# Patient Record
Sex: FEMALE | Race: WHITE | NOT HISPANIC OR LATINO | Employment: FULL TIME | URBAN - METROPOLITAN AREA
[De-identification: names, ages, dates, MRNs, and addresses within clinical notes are randomized per-mention and may not be internally consistent; named-entity substitution may affect disease eponyms.]

---

## 2017-08-09 ENCOUNTER — GENERIC CONVERSION - ENCOUNTER (OUTPATIENT)
Dept: OTHER | Facility: OTHER | Age: 52
End: 2017-08-09

## 2018-01-02 ENCOUNTER — ALLSCRIPTS OFFICE VISIT (OUTPATIENT)
Dept: OTHER | Facility: OTHER | Age: 53
End: 2018-01-02

## 2018-01-02 DIAGNOSIS — R29.898 OTHER SYMPTOMS AND SIGNS INVOLVING THE MUSCULOSKELETAL SYSTEM: ICD-10-CM

## 2018-01-03 NOTE — CONSULTS
Assessment   1  Chronic midline low back pain with bilateral sciatica (724 2,724 3,338 29)     (M54 41,M54 42,G89 29)   2  Pain in both thighs (729 5) (M79 651,M79 652)   3  Weakness of both lower extremities (729 89) (R29 898)    Plan   Chronic midline low back pain with bilateral sciatica    · *1 - [facility] Co-Management  *  Status: Active  Requested for:    [id number]   Ordered; For: Chronic midline low back pain with bilateral sciatica; Ordered By: Noel Ingram Performed:  Due: 55YYI5817  Care Summary provided  : Yes  Weakness of both lower extremities    · (1) ACETYLCHOLINE RECEPTOR ANTIBODY; Status:Active; Requested PHS:[id number]; Perform:[facility]; SUU:[id number];JYNUWAJ; For:Weakness of both lower extremities; Ordered By:Qi Lane;   · (1) ACETYLCHOLINE RECEPTOR, BLOCKING; Status:Active; Requested HIM:[id number]; Perform:[facility]; JUF:[id number];CPLJUCZ; For:Weakness of both lower extremities; Ordered By:Qi Lane;   · (1) ACETYLCHOLINE RECEPTOR, MODULATING; Status:Active; Requested    HHC:[id number]; Perform:[facility]; VWN:[id number]; For:Weakness of both lower extremities; Ordered By:Qi Lane;   · (1) TERESA SCREEN W/REFLEX TO TITER/PATTERN; Status:Active; Requested    CRU:[id number]; Perform:[facility]; ARQ:[id number];LFNJMKX; For:Weakness of both lower extremities; Ordered By:Qi Lane;   · (1) CK (CPK); Status:Active; Requested CSB:[id number]; Perform:[facility]; NQT:[id number];YFSZTJI; For:Weakness of both lower extremities; Ordered By:Qi Lane;   · (1) C-REACTIVE PROTEIN; Status:Active; Requested QIY:[id number]; Perform:[facility]; MMD:[id number];ISHXHTY; For:Weakness of both lower extremities; Ordered By:Qi Lane;   · (1) RHEUMATOID FACTOR SCREEN; Status:Active; Requested SLS:[id number]; Perform:[facility]; MZF:[id number];HQGTBQA; For:Weakness of both lower extremities; Ordered By:Qi Lane;   · (1) SED RATE; Status:Active; Requested IWL:[id number]; Perform:[facility]; WJY:[id number];BXXLEDO; For:Weakness of both lower extremities; Ordered By:Qi Lane;   · (1) TSH; Status:Active; Requested ZUR:[id number]; Perform:[facility]; KTU:[id number];MSZVXTD; For:Weakness of both lower extremities; Ordered By:Qi Lane;   · Follow-up visit in 3 months Evaluation and Treatment  Follow-up  Status: Complete     Done: 12GFU1741   Ordered; For: Weakness of both lower extremities; Ordered By: Marcin Tesfaye Performed:  Due: 51WSH6725; Last Updated By: Twin Matos; 1/2/2018 10:28:55 AM    Discussion/Summary   Discussion Summary:    Bilateral thigh pain: Etiology unclear  ? Related to lower back issues or musculoskeletal  extensive workup in the past by other neurologist has been inconclusive per the patient  Patient recommended to send lumbar spine MRI results and lab work results performed at [facility] and other physicians in the past  Will obtain CPK level, Acetyl choline receptor antibodies and other blood work as well  EMG showed no evidence of myopathy or neuropathy of right lower extremity and bilateral upper extremities  Patient is on low-dose Cymbalta     Chronic lower back pain: Will refer patient to pain management  Patient recommended to send us records from the neurosurgeon as well as MRI of the lumbar spine  Patient states that physical therapy made her symptoms worse in the past     Counseling Documentation With Imm: The patient was counseled regarding instructions for management,-- patient and family education  total time of encounter was 50 minutes-- and-- 25 minutes was spent counseling  Chief Complaint   Chief Complaint Free Text Note Form: Patient present for neurology consult for leg weakness        History of Present Illness   HPI: Patient is a 81-year-old lady coming in for consultation in regards to leg weakness  states that about a year ago she and her  started doing exercises  She felt a muscle pull in the left lower extremity mostly in thigh and since then soon in a week she had similar symptoms in the other leg as well  She has some paresthesia in her thighs  She has chronic low back pain  She uses a cane more for steps and balance  She states she fell three times in February 2017, but since then she has not fallen down  She felt she was limp and MRI lower spine was done and felt to have discs  She was given prednisone which helped and her symptoms came back  She was later tried on Cymbalta 30 mg daily which helped but did not take away pain completely and later had blurred vision, so was stopped  She was tried on Gabapentin and did not work  She was again tried on Cymbalta 20 mg every other day and now has started every day  She also does deep muscle massage as well  However it feels better with massage however later it did not work  She tried PT but it made it worse  She was seen by Spine surgeon, Dr Kole Quispe and it was told no surgery is recommended for her back  She was not evaluated by pain management  Patient told to send results of MRI L-spine ( not available) and blood work  of the cervical spine done at [facility] on 07/20/2017 showed no significant central canal or foraminal stenosis throughout the cervical region, diffuse minimal degenerative disc disease, mild to moderate facet arthropathy from C3-C4 through C7-T1 with moderate left facet arthropathy at C2-C3, very small broad-based central to right paracentral disc protrusion at C5-C6 causing minimal central canal stenosis  MRI of the thoracic spine on 7/ 20/17 showed no central canal or foraminal stenosis throughout the thoracic spine, thoracic cord is normal in its appearance, diffuse moderate facet arthropathy most prominent at the T10-11 level, very mild degenerative disc disease from T2-3 3 through T8-T9   MRI of the brain on 07/11/2017 showed no enhancing intracranial mass or any acute infarct  No white matter signal abnormality or focal lesion  performed on 6/6/17 By Dr Marcela Balbuena at [facility] showed no evidence of any median neuropathy at both the wrists, no evidence of bilateral ulnar neuropathy at the elbow or cervical radiculopathy, plexopathy or other pathology  EMG performed by Dr Marylene Silvius on 05/16/2017 of her right lower extremity showed it was a normal study, no evidence of generalized large fiber neuropathy, no evidence of generalized myopathy no clear electrophysiological evidence of lumbosacral radiculopathy on the right  However upper motor neuron lesion cannot be ruled out based on EMG     Reviewed records from neurologist at [facility], Dr Yvrose Larson who initially started off the evaluation in May 2017 with peripheral nervous system workup with EMG is of bilateral upper and lower extremities and blood work however as these testing was normal later central nervous system etiology was explored with MRI of the cervical spine thoracic spine MRI brain and additional blood work That was ordered in June 2017  Review of Systems   Neurological ROS:      Constitutional: appetite changes  HEENT: blurred vision-- and-- dysphagia  Cardiovascular:  no chest pain or pressure, no palpitations present, the heart rate was not rapid or irregular, no swelling in the arms or legs, no poor circulation  Respiratory:  no unusual or persistant cough, no shortness of breath with or without exertion  Gastrointestinal:  no nausea, no vomiting, no diarrhea, no abdominal pain, no changes in bowel habits, no melena, no loss of bowel control  Genitourinary:  no incontinence, no feelings of urinary urgency, no increase in frequency, no urinary hesitancy, no dysuria, no hematuria  Musculoskeletal: arthralgias,-- myalgias,-- head/neck/back pain-- and-- pain while walking  Integumentary   no masses, no rash, no skin lesions, no livedo reticularis  Psychiatric:  no anxiety, no depression, no mood swings, no psychiatric hospitalizations, no sleep problems  Endocrine  no unusual weight loss or gain, no excessive urination, no excessive thirst, no hair loss or gain, no hot or cold intolerance, no menstrual period change or irregularity, no loss of sexual ability or drive, no erection difficulty, no nipple discharge  Hematologic/Lymphatic:  no unusual bleeding, no tendency for easy bruising, no clotting skin or lumps  Neurological General: waking up at night  Neurological Mental Status:  no confusion, no mood swings, no alteration or loss of consciousness, no difficulty expressing/understanding speech, no memory problems  Neurological Cranial Nerves: taste or smell loss/changes  Neurological Motor findings include:  no tremor, no twitching, no cramping(pre/post exercise), no atrophy  Neurological Coordination:  no unsteadiness, no vertigo or dizziness, no clumsiness, no problems reaching for objects  Neurological Sensory: tingling  Neurological Gait: difficulty walking  ROS Reviewed:    ROS reviewed  Surgical History   1  History of Femoral Hernia Repair   2  History of Myringotomy - With Ventilating Tube Insertion   3  History of Tonsillectomy   4  History of Treatment Of Pelvic Fracture  Surgical History Reviewed: The surgical history was reviewed and updated today  Family History   Father    1  Family history of lung cancer (V16 1) (Z80 1)  Maternal Grandmother    2  Family history of Parkinsonism (V17 2) (Z82 0)  Family History Reviewed: The family history was reviewed and updated today  Social History    · Drinks coffee   · Former smoker (S32 76) (K02 499)   · Lives with    · Lives with son   ·    · No illicit drug use   · One child   · Social drinker (V49 89) (Z78 9)  Social History Reviewed:  The social history was reviewed and updated today  Current Meds    1  Advil TABS; TAKE 1 TABLET 3 TIMES DAILY AS NEEDED; Therapy: (Recorded:02Jan2018) to Recorded   2  Cymbalta 30 MG Oral Capsule Delayed Release Particles (DULoxetine HCl); take 1     capsule daily; Therapy: (Recorded:02Jan2018) to Recorded   3  Tylenol CAPS; TAKE 1 CAPSULE Every 6 hours PRN; Therapy: ([id number]) to Recorded  Medication List Reviewed: The medication list was reviewed and updated today  Allergies   1  No Known Drug Allergies    Vitals   Signs   Recorded: 02Jan2018 09:36AM   Heart Rate: 80  Respiration: 16  Systolic: 779, LUE, Sitting  Diastolic: 77, LUE, Sitting  Height: 5 ft 6 in  Weight: 197 lb   BMI Calculated: 31 8  BSA Calculated: 1 99    Physical Exam       General examination:     Patient is awake and alert  Eyes: Conjunctiva and sclera are clear  HEENT: External examination is normal  Neck: Supple  Lungs: Clear to auscultation bilaterally  CVS: S1, S2 heard  Abdomen: Soft, nontender  Extremities: No clubbing, edema, cyanosis  Skin: No rashes  Neurological examination:      Mental status: Patient is awake, alert, oriented to time place and person  Attention, concentration, fund of knowledge is intact  Language: No evidence of aphasia or dysarthria  Memory: Repetition 3/3 and recall 3/3  Cranial nerves: Pupils equal, reacting to light and accommodation  Extraocular movements intact  Visual fields are full  Fundi are difficult to visualize bilaterally  Facial sensation is intact  No facial weakness is noted  Finger rub test is intact bilaterally  Tongue and uvula are in midline  Gag is intact  Shoulder shrug is 5/5 bilaterally  Motor examination: Tone is normal  No atrophy noted  Strength is 5/5 throughout and bilateral HF had poor effort and 5-/5 on both sides  Sensory examination: Light touch is intact  Pinprick, temperature are intact   Vibration is intact Proprioception is intact  Deep tendon reflexes: 1 throughout  Plantars are downgoing bilaterally  Coordination: Finger-nose test and finger tapping intact bilaterally  Heel-to-shin test is difficult to perform bilaterally  Gait: Patient walks with a slight limp with help of her cane         Results/Data   Diagnostic Studies Reviewed:      Diagnostic Review MRI of the cervical spine done at [facility] on 07/20/2017 showed no significant central canal or foraminal stenosis throughout the cervical region, diffuse minimal degenerative disc disease, mild to moderate facet arthropathy from C3-C4 through C7-T1 with moderate left facet arthropathy at C2-C3, very small broad-based central to right paracentral disc protrusion at C5-C6 causing minimal central canal stenosis  MRI of the thoracic spine on 7/ 20/17 showed no central canal or foraminal stenosis throughout the thoracic spine, thoracic cord is normal in its appearance, diffuse moderate facet arthropathy most prominent at the T10-11 level, very mild degenerative disc disease from T2-3 3 through T8-T9  MRI of the brain on 07/11/2017 showed no enhancing intracranial mass or any acute infarct  No white matter signal abnormality or focal lesion  performed on 6/6/17 By Dr Graciela Craig at [facility] showed no evidence of any median neuropathy at both the wrists, no evidence of bilateral ulnar neuropathy at the elbow or cervical radiculopathy, plexopathy or other pathology  EMG performed by Dr John Doyle on 05/16/2017 of her right lower extremity showed it was a normal study, no evidence of generalized large fiber neuropathy, no evidence of generalized myopathy no clear electrophysiological evidence of lumbosacral radiculopathy on the right  However upper motor neuron lesion cannot be ruled out based on EMG        Signatures    Electronically signed by : Lisa Weber MD; Jan 2 2018 11:35AM EST                       (Author)

## 2018-01-22 VITALS
DIASTOLIC BLOOD PRESSURE: 77 MMHG | WEIGHT: 197 LBS | HEART RATE: 80 BPM | HEIGHT: 66 IN | SYSTOLIC BLOOD PRESSURE: 163 MMHG | BODY MASS INDEX: 31.66 KG/M2 | RESPIRATION RATE: 16 BRPM

## 2018-01-23 ENCOUNTER — GENERIC CONVERSION - ENCOUNTER (OUTPATIENT)
Dept: OTHER | Facility: OTHER | Age: 53
End: 2018-01-23

## 2018-01-24 NOTE — MISCELLANEOUS
Message   Recorded as Task   Date: 01/02/2018 10:18 AM, Created By: System   Task Name: Schedule Appointment   Assigned To: [facility],Team   Regarding Patient: Andre Espinosa, Status: In Progress   Comment:    System - 02 Jan 2018 10:18 AM     Preferred Communication: Mail  Ordering Site: [facility]    Referred To: [id number]  To Be Done: 02 Jan 2018   Joni Rosen - 02 Jan 2018 10:27 AM     TASK REASSIGNED: Previously Assigned To Qi Lane  PLEASE CONTACT PATIENT FOR APPT  Mireille De Jesus - 02 Jan 2018 11:09 AM     TASK IN PROGRESS   Mireille De Jesus - 02 Jan 2018 11:11 AM     TASK EDITED  called patient, however she was driving and requested I call her in a half hour  no intake started   Mireille De Jesus - 02 Jan 2018 12:08 PM     TASK EDITED  intake started in [place] faxed to [place]  awaiting last 3 pcp office notes to verify pt has established care with a pcp  Active Problems    1  Chronic midline low back pain with bilateral sciatica (724 2,724 3,338 29)   (M54 41,M54 42,G89 29)   2  Pain in both thighs (729 5) (M79 651,M79 652)   3  Weakness of both lower extremities (729 89) (R29 898)    Current Meds   1  Advil TABS; TAKE 1 TABLET 3 TIMES DAILY AS NEEDED; Therapy: (Recorded:02Jan2018) to Recorded   2  Cymbalta 30 MG Oral Capsule Delayed Release Particles (DULoxetine HCl); take 1   capsule daily; Therapy: (Recorded:02Jan2018) to Recorded   3  Tylenol CAPS; TAKE 1 CAPSULE Every 6 hours PRN; Therapy: (Recorded:02Jan2018) to Recorded    Allergies    1   No Known Drug Allergies    Signatures   Electronically signed by : Kieran Knight, ; Jan 23 2018  3:14PM EST                       (Author)

## 2018-08-13 ENCOUNTER — TRANSCRIBE ORDERS (OUTPATIENT)
Dept: ADMINISTRATIVE | Facility: HOSPITAL | Age: 53
End: 2018-08-13

## 2018-08-13 ENCOUNTER — APPOINTMENT (OUTPATIENT)
Dept: LAB | Facility: IMAGING CENTER | Age: 53
End: 2018-08-13
Payer: COMMERCIAL

## 2018-08-13 DIAGNOSIS — M54.5 LOW BACK PAIN, UNSPECIFIED BACK PAIN LATERALITY, UNSPECIFIED CHRONICITY, WITH SCIATICA PRESENCE UNSPECIFIED: Primary | ICD-10-CM

## 2018-08-13 DIAGNOSIS — M54.16 LUMBAR RADICULOPATHY: ICD-10-CM

## 2018-08-13 DIAGNOSIS — M51.26 LUMBAR DISC HERNIATION: ICD-10-CM

## 2018-08-13 DIAGNOSIS — M54.5 LOW BACK PAIN, UNSPECIFIED BACK PAIN LATERALITY, UNSPECIFIED CHRONICITY, WITH SCIATICA PRESENCE UNSPECIFIED: ICD-10-CM

## 2018-08-13 LAB
CK SERPL-CCNC: 72 U/L (ref 26–192)
ERYTHROCYTE [SEDIMENTATION RATE] IN BLOOD: 18 MM/HOUR (ref 0–20)

## 2018-08-13 PROCEDURE — 36415 COLL VENOUS BLD VENIPUNCTURE: CPT

## 2018-08-13 PROCEDURE — 85652 RBC SED RATE AUTOMATED: CPT

## 2018-08-13 PROCEDURE — 82550 ASSAY OF CK (CPK): CPT
